# Patient Record
(demographics unavailable — no encounter records)

---

## 2025-07-23 NOTE — HISTORY OF PRESENT ILLNESS
[de-identified] : In office for follow-up for food allergies.  Came to the office with mother.  Transfer of care from Dr. Mariscal retired.  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  Had difficulty breathing, turned purple and needed EpiPen.  It was attributed to either milk and egg or traces of peanuts.  Subsequently outgrew milk and egg allergy, now able to consume.  Blood work showed IgE to peanuts, tree nuts, sesame and shellfish.  He avoids all these foods with no clinical reaction from accidental exposure.  He might have tolerated small amount of sesame when he was much younger.  Carries EpiPen but prefers Auvi-Q.  Allergic rhinitis: Stuffy nose and itchy eyes at times especially in spring.  Takes Zyrtec or Claritin as needed.  Occasional rare nasal spray. No frequent antibiotics respiratory infections.  Asthma: Has albuterol at hand.  Able to play hockey with no symptoms.  Uses albuterol only if he has to play a hockey game while he has a cold.  If he does not have to play, he does not need the albuterol even with a cold.  Occasional minimal hives with exposure to cold.  Recent blood work prior to visit showed IgE to peanut, tree nuts, sesame, shellfish and mollusks, stable and unchanged from previous in 2024.

## 2025-07-23 NOTE — PHYSICAL EXAM
[Alert] : alert [No Acute Distress] : no acute distress [Normal Voice/Communication] : normal voice communication [Supple] : the neck was supple [Normal S1, S2] : normal S1 and S2 [Regular Rhythm] : with a regular rhythm [Normal Cervical Lymph Nodes] : cervical [Skin Intact] : skin intact  [No Rash] : no rash [No clubbing] : no clubbing [No Cyanosis] : no cyanosis [Alert, Awake, Oriented as Age-Appropriate] : alert, awake, oriented as age appropriate [de-identified] : Eyes clear. [de-identified] : Throat clear.  Nasal mucosa pink, no stuffiness or discharge.  Tympanic membranes normal.  No sinus tenderness. [de-identified] : Chest clear, good air entry, no wheezing or crackles.

## 2025-07-23 NOTE — ASSESSMENT
[FreeTextEntry1] : Food allergies: Peanut, tree nuts, shellfish and sesame, through testing.  Levels stable.  Continue avoidance.  Discussed possible skin testing and oral challenge to pine nuts and calamari.  Patient will think about it and may schedule it in the future.  Carry EpiPen or Auvi-Q at all times (renewed, technique reviewed).  Food allergy plan given and reviewed. Allergic rhinitis: Minimal symptoms in spring, takes antihistamine as needed. Asthma mild intermittent: Uses albuterol only with intense exertion during the cold.  Albuterol refilled.  No need for spirometry since he is doing very well. Follow-up yearly. No need to retest for food allergies every year, retest every 2 to 3 years, since levels of IgE unlikely to change significantly at this age.